# Patient Record
Sex: FEMALE | Race: WHITE
[De-identification: names, ages, dates, MRNs, and addresses within clinical notes are randomized per-mention and may not be internally consistent; named-entity substitution may affect disease eponyms.]

---
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
## 2020-05-22 DIAGNOSIS — J30.2 OTHER SEASONAL ALLERGIC RHINITIS: ICD-10-CM

## 2020-05-22 RX ORDER — BUDESONIDE AND FORMOTEROL FUMARATE DIHYDRATE 80; 4.5 UG/1; UG/1
80-4.5 AEROSOL RESPIRATORY (INHALATION)
Refills: 0 | Status: ACTIVE | COMMUNITY

## 2020-05-27 ENCOUNTER — APPOINTMENT (OUTPATIENT)
Dept: CARDIOLOGY | Facility: CLINIC | Age: 20
End: 2020-05-27
Payer: COMMERCIAL

## 2020-05-27 DIAGNOSIS — J45.20 MILD INTERMITTENT ASTHMA, UNCOMPLICATED: ICD-10-CM

## 2020-05-27 DIAGNOSIS — I10 ESSENTIAL (PRIMARY) HYPERTENSION: ICD-10-CM

## 2020-05-27 PROCEDURE — 99203 OFFICE O/P NEW LOW 30 MIN: CPT | Mod: 95

## 2020-05-27 NOTE — DISCUSSION/SUMMARY
[Hypertension] : hypertension [FreeTextEntry1] : \par Currently stable from a cardiovascular standpoint. Borderline hypertensive lately. Appears asymptomatic. Advised patient to continue monitoring BP at this time while off of oral contraceptive. Should BP (DBP has been >= 90 mmHg lately) remain elevated, will consider anti-hypertensive medication. Low salt diet advised. Patient to call our office with BP readings this upcoming week. May consider echo to assess cardiac structures and function. In addition, may consider labs to look for secondary causes of HTN.

## 2020-05-27 NOTE — HISTORY OF PRESENT ILLNESS
[FreeTextEntry1] : Patient with obesity, asthma, and possible insulin resistance. Conditions have been stable. Patient was noted to have elevated BP back in April (160/84 mmHg) while in doctor's office. She feels well and has not had BP issues in the past. Of note, she started oral contraceptive in 2018 and discontinued on 5/7/20 as it was thought to possibly contribute to the elevated BP. Since stopping, BP has been about 130/90's mmHg. She has asthma but it seems to be controlled. She has been under a lot of stress during this pandemic with school (2nd year of undergraduate studies) and just in general. Denies chest pain, shortness of breath, palpitations, headaches, or dizziness. Walks frequently without issues.

## 2022-07-29 ENCOUNTER — NON-APPOINTMENT (OUTPATIENT)
Age: 22
End: 2022-07-29

## 2022-07-30 ENCOUNTER — NON-APPOINTMENT (OUTPATIENT)
Age: 22
End: 2022-07-30

## 2022-11-29 ENCOUNTER — INPATIENT (INPATIENT)
Facility: HOSPITAL | Age: 22
LOS: 1 days | Discharge: HOME | DRG: 552 | End: 2022-12-01
Attending: NEUROLOGICAL SURGERY | Admitting: NEUROLOGICAL SURGERY
Payer: COMMERCIAL

## 2022-11-29 VITALS
DIASTOLIC BLOOD PRESSURE: 98 MMHG | HEART RATE: 101 BPM | RESPIRATION RATE: 19 BRPM | OXYGEN SATURATION: 97 % | SYSTOLIC BLOOD PRESSURE: 133 MMHG | TEMPERATURE: 98 F

## 2022-11-29 DIAGNOSIS — M54.30 SCIATICA, UNSPECIFIED SIDE: ICD-10-CM

## 2022-11-29 LAB
ALBUMIN SERPL ELPH-MCNC: 3.8 G/DL — SIGNIFICANT CHANGE UP (ref 3.3–5)
ALP SERPL-CCNC: 49 U/L — SIGNIFICANT CHANGE UP (ref 40–120)
ALT FLD-CCNC: 34 U/L — SIGNIFICANT CHANGE UP (ref 12–78)
ANION GAP SERPL CALC-SCNC: 6 MMOL/L — SIGNIFICANT CHANGE UP (ref 5–17)
AST SERPL-CCNC: 20 U/L — SIGNIFICANT CHANGE UP (ref 15–37)
BASOPHILS # BLD AUTO: 0.05 K/UL — SIGNIFICANT CHANGE UP (ref 0–0.2)
BASOPHILS NFR BLD AUTO: 0.5 % — SIGNIFICANT CHANGE UP (ref 0–2)
BILIRUB SERPL-MCNC: 0.7 MG/DL — SIGNIFICANT CHANGE UP (ref 0.2–1.2)
BUN SERPL-MCNC: 9 MG/DL — SIGNIFICANT CHANGE UP (ref 7–23)
CALCIUM SERPL-MCNC: 9.7 MG/DL — SIGNIFICANT CHANGE UP (ref 8.5–10.1)
CHLORIDE SERPL-SCNC: 108 MMOL/L — SIGNIFICANT CHANGE UP (ref 96–108)
CO2 SERPL-SCNC: 22 MMOL/L — SIGNIFICANT CHANGE UP (ref 22–31)
CREAT SERPL-MCNC: 0.52 MG/DL — SIGNIFICANT CHANGE UP (ref 0.5–1.3)
EGFR: 135 ML/MIN/1.73M2 — SIGNIFICANT CHANGE UP
EOSINOPHIL # BLD AUTO: 0.09 K/UL — SIGNIFICANT CHANGE UP (ref 0–0.5)
EOSINOPHIL NFR BLD AUTO: 0.8 % — SIGNIFICANT CHANGE UP (ref 0–6)
FLUAV AG NPH QL: SIGNIFICANT CHANGE UP
FLUBV AG NPH QL: SIGNIFICANT CHANGE UP
GLUCOSE SERPL-MCNC: 99 MG/DL — SIGNIFICANT CHANGE UP (ref 70–99)
HCG SERPL-ACNC: <1 MIU/ML — SIGNIFICANT CHANGE UP
HCT VFR BLD CALC: 41.4 % — SIGNIFICANT CHANGE UP (ref 34.5–45)
HGB BLD-MCNC: 13.5 G/DL — SIGNIFICANT CHANGE UP (ref 11.5–15.5)
IMM GRANULOCYTES NFR BLD AUTO: 0.4 % — SIGNIFICANT CHANGE UP (ref 0–0.9)
LYMPHOCYTES # BLD AUTO: 19.5 % — SIGNIFICANT CHANGE UP (ref 13–44)
LYMPHOCYTES # BLD AUTO: 2.1 K/UL — SIGNIFICANT CHANGE UP (ref 1–3.3)
MCHC RBC-ENTMCNC: 27.6 PG — SIGNIFICANT CHANGE UP (ref 27–34)
MCHC RBC-ENTMCNC: 32.6 GM/DL — SIGNIFICANT CHANGE UP (ref 32–36)
MCV RBC AUTO: 84.7 FL — SIGNIFICANT CHANGE UP (ref 80–100)
MONOCYTES # BLD AUTO: 0.55 K/UL — SIGNIFICANT CHANGE UP (ref 0–0.9)
MONOCYTES NFR BLD AUTO: 5.1 % — SIGNIFICANT CHANGE UP (ref 2–14)
NEUTROPHILS # BLD AUTO: 7.96 K/UL — HIGH (ref 1.8–7.4)
NEUTROPHILS NFR BLD AUTO: 73.7 % — SIGNIFICANT CHANGE UP (ref 43–77)
PLATELET # BLD AUTO: 266 K/UL — SIGNIFICANT CHANGE UP (ref 150–400)
POTASSIUM SERPL-MCNC: 3.9 MMOL/L — SIGNIFICANT CHANGE UP (ref 3.5–5.3)
POTASSIUM SERPL-SCNC: 3.9 MMOL/L — SIGNIFICANT CHANGE UP (ref 3.5–5.3)
PROT SERPL-MCNC: 7.6 GM/DL — SIGNIFICANT CHANGE UP (ref 6–8.3)
RBC # BLD: 4.89 M/UL — SIGNIFICANT CHANGE UP (ref 3.8–5.2)
RBC # FLD: 12.1 % — SIGNIFICANT CHANGE UP (ref 10.3–14.5)
RSV RNA NPH QL NAA+NON-PROBE: SIGNIFICANT CHANGE UP
SARS-COV-2 RNA SPEC QL NAA+PROBE: SIGNIFICANT CHANGE UP
SODIUM SERPL-SCNC: 136 MMOL/L — SIGNIFICANT CHANGE UP (ref 135–145)
WBC # BLD: 10.79 K/UL — HIGH (ref 3.8–10.5)
WBC # FLD AUTO: 10.79 K/UL — HIGH (ref 3.8–10.5)

## 2022-11-29 PROCEDURE — 0241U: CPT

## 2022-11-29 PROCEDURE — 97530 THERAPEUTIC ACTIVITIES: CPT | Mod: GP

## 2022-11-29 PROCEDURE — 97116 GAIT TRAINING THERAPY: CPT | Mod: GP

## 2022-11-29 PROCEDURE — 90686 IIV4 VACC NO PRSV 0.5 ML IM: CPT

## 2022-11-29 PROCEDURE — 99221 1ST HOSP IP/OBS SF/LOW 40: CPT

## 2022-11-29 PROCEDURE — 97162 PT EVAL MOD COMPLEX 30 MIN: CPT | Mod: GP

## 2022-11-29 PROCEDURE — 90471 IMMUNIZATION ADMIN: CPT

## 2022-11-29 PROCEDURE — 99285 EMERGENCY DEPT VISIT HI MDM: CPT

## 2022-11-29 PROCEDURE — 72148 MRI LUMBAR SPINE W/O DYE: CPT | Mod: 26,MC

## 2022-11-29 RX ORDER — HYDROMORPHONE HYDROCHLORIDE 2 MG/ML
0.5 INJECTION INTRAMUSCULAR; INTRAVENOUS; SUBCUTANEOUS
Refills: 0 | Status: DISCONTINUED | OUTPATIENT
Start: 2022-11-29 | End: 2022-12-01

## 2022-11-29 RX ORDER — MORPHINE SULFATE 50 MG/1
4 CAPSULE, EXTENDED RELEASE ORAL ONCE
Refills: 0 | Status: DISCONTINUED | OUTPATIENT
Start: 2022-11-29 | End: 2022-11-29

## 2022-11-29 RX ORDER — SENNA PLUS 8.6 MG/1
2 TABLET ORAL AT BEDTIME
Refills: 0 | Status: DISCONTINUED | OUTPATIENT
Start: 2022-11-29 | End: 2022-12-01

## 2022-11-29 RX ORDER — ACETAMINOPHEN 500 MG
650 TABLET ORAL EVERY 6 HOURS
Refills: 0 | Status: DISCONTINUED | OUTPATIENT
Start: 2022-11-29 | End: 2022-12-01

## 2022-11-29 RX ORDER — OXYCODONE HYDROCHLORIDE 5 MG/1
5 TABLET ORAL EVERY 4 HOURS
Refills: 0 | Status: DISCONTINUED | OUTPATIENT
Start: 2022-11-29 | End: 2022-12-01

## 2022-11-29 RX ORDER — CYCLOBENZAPRINE HYDROCHLORIDE 10 MG/1
10 TABLET, FILM COATED ORAL THREE TIMES A DAY
Refills: 0 | Status: DISCONTINUED | OUTPATIENT
Start: 2022-11-29 | End: 2022-12-01

## 2022-11-29 RX ORDER — DEXAMETHASONE 0.5 MG/5ML
4 ELIXIR ORAL EVERY 6 HOURS
Refills: 0 | Status: DISCONTINUED | OUTPATIENT
Start: 2022-11-29 | End: 2022-12-01

## 2022-11-29 RX ORDER — SPIRONOLACTONE 25 MG/1
3 TABLET, FILM COATED ORAL
Qty: 0 | Refills: 0 | DISCHARGE

## 2022-11-29 RX ORDER — SPIRONOLACTONE 25 MG/1
75 TABLET, FILM COATED ORAL AT BEDTIME
Refills: 0 | Status: DISCONTINUED | OUTPATIENT
Start: 2022-11-29 | End: 2022-12-01

## 2022-11-29 RX ORDER — FAMOTIDINE 10 MG/ML
20 INJECTION INTRAVENOUS EVERY 12 HOURS
Refills: 0 | Status: DISCONTINUED | OUTPATIENT
Start: 2022-11-29 | End: 2022-12-01

## 2022-11-29 RX ORDER — ONDANSETRON 8 MG/1
4 TABLET, FILM COATED ORAL EVERY 6 HOURS
Refills: 0 | Status: DISCONTINUED | OUTPATIENT
Start: 2022-11-29 | End: 2022-12-01

## 2022-11-29 RX ORDER — ACETAMINOPHEN 500 MG
1000 TABLET ORAL EVERY 6 HOURS
Refills: 0 | Status: DISCONTINUED | OUTPATIENT
Start: 2022-11-29 | End: 2022-12-01

## 2022-11-29 RX ORDER — INFLUENZA VIRUS VACCINE 15; 15; 15; 15 UG/.5ML; UG/.5ML; UG/.5ML; UG/.5ML
0.5 SUSPENSION INTRAMUSCULAR ONCE
Refills: 0 | Status: COMPLETED | OUTPATIENT
Start: 2022-11-29 | End: 2022-12-01

## 2022-11-29 RX ORDER — ACETAMINOPHEN 500 MG
2 TABLET ORAL
Qty: 0 | Refills: 0 | DISCHARGE

## 2022-11-29 RX ORDER — NORGESTIMATE AND ETHINYL ESTRADIOL 7DAYSX3 LO
1 KIT ORAL
Qty: 0 | Refills: 0 | DISCHARGE

## 2022-11-29 RX ORDER — OXYCODONE HYDROCHLORIDE 5 MG/1
10 TABLET ORAL EVERY 4 HOURS
Refills: 0 | Status: DISCONTINUED | OUTPATIENT
Start: 2022-11-29 | End: 2022-12-01

## 2022-11-29 RX ORDER — KETOCONAZOLE 20 MG/G
1 AEROSOL, FOAM TOPICAL
Qty: 0 | Refills: 0 | DISCHARGE

## 2022-11-29 RX ORDER — ALBUTEROL 90 UG/1
2 AEROSOL, METERED ORAL
Qty: 0 | Refills: 0 | DISCHARGE

## 2022-11-29 RX ORDER — BUDESONIDE AND FORMOTEROL FUMARATE DIHYDRATE 160; 4.5 UG/1; UG/1
2 AEROSOL RESPIRATORY (INHALATION)
Qty: 0 | Refills: 0 | DISCHARGE

## 2022-11-29 RX ORDER — DEXAMETHASONE 0.5 MG/5ML
6 ELIXIR ORAL ONCE
Refills: 0 | Status: COMPLETED | OUTPATIENT
Start: 2022-11-29 | End: 2022-11-29

## 2022-11-29 RX ORDER — BUDESONIDE AND FORMOTEROL FUMARATE DIHYDRATE 160; 4.5 UG/1; UG/1
2 AEROSOL RESPIRATORY (INHALATION)
Refills: 0 | Status: DISCONTINUED | OUTPATIENT
Start: 2022-11-29 | End: 2022-12-01

## 2022-11-29 RX ORDER — ALBUTEROL 90 UG/1
2 AEROSOL, METERED ORAL EVERY 6 HOURS
Refills: 0 | Status: DISCONTINUED | OUTPATIENT
Start: 2022-11-29 | End: 2022-12-01

## 2022-11-29 RX ORDER — CYCLOBENZAPRINE HYDROCHLORIDE 10 MG/1
10 TABLET, FILM COATED ORAL ONCE
Refills: 0 | Status: COMPLETED | OUTPATIENT
Start: 2022-11-29 | End: 2022-11-29

## 2022-11-29 RX ADMIN — CYCLOBENZAPRINE HYDROCHLORIDE 10 MILLIGRAM(S): 10 TABLET, FILM COATED ORAL at 15:47

## 2022-11-29 RX ADMIN — MORPHINE SULFATE 4 MILLIGRAM(S): 50 CAPSULE, EXTENDED RELEASE ORAL at 16:50

## 2022-11-29 RX ADMIN — OXYCODONE HYDROCHLORIDE 10 MILLIGRAM(S): 5 TABLET ORAL at 21:44

## 2022-11-29 RX ADMIN — Medication 6 MILLIGRAM(S): at 15:47

## 2022-11-29 NOTE — ED STATDOCS - CLINICAL SUMMARY MEDICAL DECISION MAKING FREE TEXT BOX
pt presents to the ED w/ lower back pain, sent in by Dr. Mendoza's team. Plan: Contact Dr. Mendoza's team. pt presents to the ED w/ lower back pain, sent in by Dr. Mendoza's team. Plan: Contact Dr. Mendoza's team.    signed DANIEL Caballero Dr in ED to speak to pt and family. Plan admission due to large lumbar disc herniation on MRI and pt still in pain with walking or movement. High likelihood of bounceback to ER. Admit to Dr Liang's service for pain treatment. Pt agrees with admission and plan of care.

## 2022-11-29 NOTE — ED STATDOCS - PROGRESS NOTE DETAILS
signed Tori Purcell PA-C Pt seen initially in intake by Dr. Martin   22F with low back pain radiating down bilat LEs R>L. Pt sent for eval and MRI by neurosx Dr Hannon. No significant findings on labwork. MRI with right sided disc herniation L4 L5. I spoke to neurosx PA who will speak to Dr Hannon. Pt feeling better after meds but only if she is lying flat on her pain.

## 2022-11-29 NOTE — ED STATDOCS - MUSCULOSKELETAL, MLM
TTP to lower lumbar spine. Pt able to ambulate. TTP to lower lumbar spine. Pt able to ambulate, but uncomfortable.  Distal n/v/m intact

## 2022-11-29 NOTE — ED STATDOCS - NS ED ATTENDING STATEMENT MOD
This was a shared visit with the JUDIT. I reviewed and verified the documentation and independently performed the documented:

## 2022-11-29 NOTE — ED ADULT TRIAGE NOTE - CHIEF COMPLAINT QUOTE
Pt arrives to ED sent in by MD Mendoza for lower back pain not relieved by flexeril. denies other medical history.

## 2022-11-29 NOTE — H&P ADULT - ASSESSMENT
Patient is a 22 year old LH female with significant PMH that p/w LBP radiating down bilateral LEs R >L since 11/26, she reports she also has associated numbness in bilateral thighs & R foot.  MRI L spine significant for a large, right sided L4-5 disc herniation with impingement on exiting nerve root.    Plan:  - Admit to [facility]  - Pain control  - Given 6mg Decadron in ER continue 4mg every 6 hours  - Dr. Arriola discussed with patient at bedside she would like to see how she does with medication before pursuing surgery  - PT/OOB  - GI ppx with steroid  - SCDs DVT ppx    Discussed with Dr. Arriola

## 2022-11-29 NOTE — H&P ADULT - HISTORY OF PRESENT ILLNESS
Patient is a 22 year old LH female with no significant PMH, sent from Dr. Arriola office p/w LBP radiating down bilateral LEs R >L since 11/26, she reports she also has associated numbness in bilateral thighs & R foot. She denies any inciting event however is a . She denies any recent trauma, bowel/bladder dysfunction or saddle anesthesia.

## 2022-11-29 NOTE — PHARMACOTHERAPY INTERVENTION NOTE - COMMENTS
Medication reconciliation completed.  Reviewed Medication list and confirmed med allergies with patient; confirmed with Dr. Cao [facility].  Pt confirms that the Flexeril & Meloxicam were recently prescribed from a Doctor who had misdiagnosed her current pain, and stopped taking as of today.

## 2022-11-29 NOTE — CONSULT NOTE ADULT - ASSESSMENT
22 year old female with LBP radiating to bilateral LEs  MRI L spine ordered  pain meds/muscle relaxants  d/w Dr Kumar

## 2022-11-29 NOTE — ED ADULT NURSE NOTE - OBJECTIVE STATEMENT
Pt C/O LOWER BACK PAIN STARTED month ago increase in pain this Saturday. Pt was send by Dr Mendoza for MRI of her back . pt is able to walk pain radiating to left leg denies numbness.

## 2022-11-29 NOTE — CONSULT NOTE ADULT - SUBJECTIVE AND OBJECTIVE BOX
22 year old LH female with significant PMH that p/w LBP rdaiating down bilateral LEs R >L since 11/26, she reports she also has associated numbness in bilateral thighs & R foot. She denies any recent trauma, bowel/bladder dysfunction or saddle anesthesia.      PAST MEDICAL & SURGICAL HISTORY:   denies      Allergies    No Known Allergies    SOCIAL HISTORY: nonsmoker occasional etoh, no drug use    FAMILY HISTORY: non contributory    Vital Signs Last 24 Hrs  T(C): 36.7 (29 Nov 2022 14:12), Max: 36.7 (29 Nov 2022 14:12)  T(F): 98.1 (29 Nov 2022 14:12), Max: 98.1 (29 Nov 2022 14:12)  HR: 101 (29 Nov 2022 14:12) (101 - 101)  BP: 133/98 (29 Nov 2022 14:12) (133/98 - 133/98)  BP(mean): 109 (29 Nov 2022 14:12) (109 - 109)  RR: 19 (29 Nov 2022 14:12) (19 - 19)  SpO2: 97% (29 Nov 2022 14:12) (97% - 97%)    Parameters below as of 29 Nov 2022 14:12  Patient On (Oxygen Delivery Method): room air        LABS:                        13.5   10.79 )-----------( 266      ( 29 Nov 2022 15:34 )             41.4     11-29    136  |  108  |  9   ----------------------------<  99  3.9   |  22  |  0.52    Ca    9.7      29 Nov 2022 15:34    TPro  7.6  /  Alb  3.8  /  TBili  0.7  /  DBili  x   /  AST  20  /  ALT  34  /  AlkPhos  49  11-29          REVIEW OF SYSTEMS: denies any recent illness, trauma, chest pain, palpitations, SOB or abdominal pain    Constitutional: awake and alert.  HEENT: PERRL, EOMI  Neck: Supple.  Respiratory: Breath sounds are clear bilaterally  Cardiovascular: S1 and S2, regular rhythm  Gastrointestinal: soft, nontender  Extremities:  no edema  Musculoskeletal: no joint swelling/tenderness, no abnormal movements  Skin: No rashes    Neurological exam:   Awake alert, AOx3, follows commands  Face symmetrical, speech clear & fluent  pupils reactive bilat, EOMI, no nystagmus  Motor: No pronator drift, Strength 5/5 in all 4 ext, normal bulk and tone, no tremor, rigidity or bradykinesia  no Hoffmans, no clonus  Sens: Intact to light touch  Gait/Balance: Not tested

## 2022-11-29 NOTE — H&P ADULT - NSHPPHYSICALEXAM_GEN_ALL_CORE
Constitutional: awake and alert.  HEENT: PERRL, EOMI  Neck: Supple.  Respiratory: Breath sounds are clear bilaterally  Cardiovascular: S1 and S2, regular rhythm  Gastrointestinal: soft, nontender  Extremities:  no edema  Musculoskeletal: no joint swelling/tenderness, no abnormal movements  Skin: No rashes    Neurological exam:   Awake alert, AOx3, follows commands  Face symmetrical, speech clear & fluent  pupils reactive bilat, EOMI, no nystagmus  Motor: No pronator drift, Strength 5/5 in all 4 ext, normal bulk and tone, no tremor, rigidity or bradykinesia  no Hoffmans, no clonus  Sens: Intact to light touch  Gait/Balance: Not tested

## 2022-11-29 NOTE — H&P ADULT - NSHPLABSRESULTS_GEN_ALL_CORE
MR Lumbar Spine No Cont (11.29.22 @ 18:38)  IMPRESSION: Large right-sided lateral recess disc herniation at the L4-L5   level with impingement upon the descending right-sided L5 nerve roots.

## 2022-11-29 NOTE — ED STATDOCS - ATTENDING APP SHARED VISIT CONTRIBUTION OF CARE
I, Hermilo Martin MD,  performed the initial face to face bedside interview with this patient regarding history of present illness, review of symptoms and relevant past medical, social and family history.  I completed an independent physical examination.  I was the initial provider who evaluated this patient.   I personally saw the patient and performed a substantive portion of the visit including all aspects of the medical decision making.  I have signed out the follow up of any pending tests (i.e. labs, radiological studies) to the JUDIT.  I have communicated the patient’s plan of care and disposition with the JUDIT.  The history, relevant review of systems, past medical and surgical history, medical decision making, and physical examination was documented by the scribe in my presence and I attest to the accuracy of the documentation.

## 2022-11-30 PROCEDURE — 99221 1ST HOSP IP/OBS SF/LOW 40: CPT

## 2022-11-30 PROCEDURE — 99231 SBSQ HOSP IP/OBS SF/LOW 25: CPT

## 2022-11-30 RX ORDER — ENOXAPARIN SODIUM 100 MG/ML
40 INJECTION SUBCUTANEOUS EVERY 24 HOURS
Refills: 0 | Status: DISCONTINUED | OUTPATIENT
Start: 2022-11-30 | End: 2022-12-01

## 2022-11-30 RX ADMIN — CYCLOBENZAPRINE HYDROCHLORIDE 10 MILLIGRAM(S): 10 TABLET, FILM COATED ORAL at 10:14

## 2022-11-30 RX ADMIN — SENNA PLUS 2 TABLET(S): 8.6 TABLET ORAL at 21:26

## 2022-11-30 RX ADMIN — Medication 4 MILLIGRAM(S): at 23:10

## 2022-11-30 RX ADMIN — OXYCODONE HYDROCHLORIDE 10 MILLIGRAM(S): 5 TABLET ORAL at 08:38

## 2022-11-30 RX ADMIN — OXYCODONE HYDROCHLORIDE 10 MILLIGRAM(S): 5 TABLET ORAL at 08:08

## 2022-11-30 RX ADMIN — SPIRONOLACTONE 75 MILLIGRAM(S): 25 TABLET, FILM COATED ORAL at 21:26

## 2022-11-30 RX ADMIN — Medication 4 MILLIGRAM(S): at 05:16

## 2022-11-30 RX ADMIN — FAMOTIDINE 20 MILLIGRAM(S): 10 INJECTION INTRAVENOUS at 21:27

## 2022-11-30 RX ADMIN — ENOXAPARIN SODIUM 40 MILLIGRAM(S): 100 INJECTION SUBCUTANEOUS at 17:11

## 2022-11-30 RX ADMIN — Medication 4 MILLIGRAM(S): at 17:11

## 2022-11-30 RX ADMIN — Medication 4 MILLIGRAM(S): at 00:02

## 2022-11-30 RX ADMIN — Medication 4 MILLIGRAM(S): at 12:19

## 2022-11-30 RX ADMIN — FAMOTIDINE 20 MILLIGRAM(S): 10 INJECTION INTRAVENOUS at 10:13

## 2022-11-30 NOTE — PHYSICAL THERAPY INITIAL EVALUATION ADULT - PATIENT/FAMILY/SIGNIFICANT OTHER GOALS STATEMENT, PT EVAL
The "pt was offered surgery" as per the neurosurgery note, but the patient "would first like to try conservative management"

## 2022-11-30 NOTE — PHYSICAL THERAPY INITIAL EVALUATION ADULT - DIAGNOSIS, PT EVAL
22 y.o. female with Low Back Pain radiating to bilateral LEs - MRI ordered, pt wants to attempt conservative management prior to considering sx 22 y.o. female with Low Back Pain radiating to bilateral LEs - MRI shows large L4-L5 disc herniation on the right side. Pt with c/o pain radiating down right leg.

## 2022-11-30 NOTE — PHYSICAL THERAPY INITIAL EVALUATION ADULT - IMPAIRMENTS CONTRIBUTING TO GAIT DEVIATIONS, PT EVAL
The pt reports that she was walking much better today compared to recent attempts to ambulate yesterday. The pt c/o persistent pain but improvement was noted.

## 2022-11-30 NOTE — CONSULT NOTE ADULT - SUBJECTIVE AND OBJECTIVE BOX
PCP:  Dr logan perkins    CHIEF COMPLAINT: back pain    HISTORY OF THE PRESENT ILLNESS: this is a 23 yo female with PMH of asthma, uses inhalers, never hospitalized for, who has had a 1 week history of lower back pain, denies injury or trauma and feels that it may have been from lifting weights at the gym. over th past week  she has tried the basic modalities but on the day of admission the pain was severe  with radiation to BLE's R>L, with associated numbness to B/L thighs and right foot.  She went to See Dr Mendoza who sent her to the ER.  MRI + Large right-sided lateral recess disc herniation at the L4-L5   level with impingement upon the descending right-sided L5 nerve roots.  On f/u different options were presented to the pt and currently she is trying tapering steroids before agreeing to surgery.  Seen at bedside, awake, alert, presently comfortable, pain increases with any movement  she denies any recent f/c/r  no burning on urinations  denies any issues with anesthesia but has only had it x 1 with wisdom teeth removed  works out at gym regularly and plays softball.  denies any cardiac history          PAST MEDICAL HISTORY: as above    PAST SURGICAL HISTORY: wisdom teeth removal    FAMILY HISTORY:   father: prostate Ca    SOCIAL HISTORY:  no smoking, rare alcohol, no drugs    ALLERGIES: NKDA    HOME MEDS: BCP    REVIEW OF SYSTEMS:   All 10 systems reviewed in detailed and found to be negative with the exception of what has already been described above    MEDICATIONS  (STANDING):  dexAMETHasone  Injectable 4 milliGRAM(s) IV Push every 6 hours  famotidine    Tablet 20 milliGRAM(s) Oral every 12 hours  influenza   Vaccine 0.5 milliLiter(s) IntraMuscular once  senna 2 Tablet(s) Oral at bedtime  spironolactone 75 milliGRAM(s) Oral at bedtime    MEDICATIONS  (PRN):  acetaminophen     Tablet .. 650 milliGRAM(s) Oral every 6 hours PRN Temp greater or equal to 38C (100.4F), Mild Pain (1 - 3)  acetaminophen   IVPB .. 1000 milliGRAM(s) IV Intermittent every 6 hours PRN Severe Pain (7 - 10)  albuterol    90 MICROgram(s) HFA Inhaler 2 Puff(s) Inhalation every 6 hours PRN Shortness of Breath and/or Wheezing  budesonide  80 MICROgram(s)/formoterol 4.5 MICROgram(s) Inhaler 2 Puff(s) Inhalation two times a day PRN sob/wheezing  cyclobenzaprine 10 milliGRAM(s) Oral three times a day PRN Muscle Spasm  HYDROmorphone  Injectable 0.5 milliGRAM(s) IV Push every 3 hours PRN severe breakthrough  ondansetron Injectable 4 milliGRAM(s) IV Push every 6 hours PRN Nausea and/or Vomiting  oxyCODONE    IR 5 milliGRAM(s) Oral every 4 hours PRN Moderate Pain (4 - 6)  oxyCODONE    IR 10 milliGRAM(s) Oral every 4 hours PRN Severe Pain (7 - 10)      VITALS:  T(F): 97.3 (11-30-22 @ 09:04), Max: 98.1 (11-29-22 @ 14:12)  HR: 69 (11-30-22 @ 09:04) (60 - 101)  BP: 122/67 (11-30-22 @ 09:04) (121/65 - 146/79)  RR: 18 (11-30-22 @ 09:04) (18 - 19)  SpO2: 100% (11-30-22 @ 09:04) (97% - 100%)  Wt(kg): --    I&O's Summary    PHYSICAL EXAM:    GENERAL: Comfortable, no acute distress   HEAD:  Normocephalic, atraumatic  EYES: EOMI, PERRLA  HEENT: Moist mucous membranes  NECK: Supple, No JVD  NERVOUS SYSTEM:  Alert & Oriented X3, Motor Strength 5/5 B/L upper and lower extremities  CHEST/LUNG: Clear to auscultation bilaterally  HEART: Regular rate and rhythm  ABDOMEN: Soft, non tender, Nondistended, Bowel sounds present  GENITOURINARY: Voiding, no palpable bladder  EXTREMITIES:   No clubbing, cyanosis, or edema  MUSCULOSKELETAL- + low back pain  SKIN-no rash    LABS:                        13.5   10.79 )-----------( 266      ( 29 Nov 2022 15:34 )             41.4     11-29    136  |  108  |  9   ----------------------------<  99  3.9   |  22  |  0.52    Ca    9.7      29 Nov 2022 15:34    TPro  7.6  /  Alb  3.8  /  TBili  0.7  /  DBili  x   /  AST  20  /  ALT  34  /  AlkPhos  49  11-29        LIVER FUNCTIONS - ( 29 Nov 2022 15:34 )  Alb: 3.8 g/dL / Pro: 7.6 gm/dL / ALK PHOS: 49 U/L / ALT: 34 U/L / AST: 20 U/L / GGT: x                 RADIOLOGY STUDIES:    < from: MR Lumbar Spine No Cont (11.29.22 @ 18:38) >    ACC: [id number] EXAM:  MR SPINE LUMBAR                          PROCEDURE DATE:  11/29/2022          INTERPRETATION:  .    CLINICAL INFORMATION: Severe lumbar radiculopathy. Pain. Lumbar stenosis.    TECHNIQUE: Multiplanar multi sequential MRI examination of the   lumbosacral spine was performed without the administration of IV   gadolinium.    COMPARISON: None available at our institution.    FINDINGS: No acute fractures or dislocations are seen. The lumbar   lordotic curvature is preserved. There is no loss of vertebral body   height or aggressive marrow signal change. The conus medullaris appears   normal in signal and morphology and terminates appropriately.    The findings at the individual disc space levels are as follows:    T12-L1: Normal.    L1-L2: Normal.    L2-L3: Normal.    L3-L4: Disc space desiccation with relative preservation of height is   seen. An annular fissure is seen in the central zone canal. A shallow   bulging disc is seen which minimally deforms the ventral thecal sac.   There is no spinal canal or foraminal stenosis.    L4-L5: Disc space desiccation with very minor loss of height is seen. A   bulging disc is seen with a superimposed broad-based disc protrusion   which measures 1.0 x 2.2 cm in the axial plane(AP by transverse) with   the apex in the right lateral recess. The disc herniation displaces and   compresses the descending right-sided L5 nerve roots. There is overall   moderate canal stenosis. No foraminal narrowing is appreciated.    L5-S1: Normal.    IMPRESSION: Large right-sided lateral recess disc herniation at the L4-L5   level with impingement upon the descending right-sided L5 nerve roots.              IMPRESSION:  23 yo female with above pmh a/w:    # acute low back pain with radiculopathy  #Large right sided disc herniations at the L4-5 level with impingement on L5 nerve roots  # admitted to spine surgery  PT eval  cont steroids  pain control with hydromorph Iv or Oxycodone po  muscle relaxers  If surgery planned pt is medically optimized for the planned procedure with a RCI Class 1 point with a class II risk and 6.0 % 30- day risk of death, MI or cardiac arrest    #asthma  cont inhalers    # VTE prophylaxis  heparin or lovenox sq if ok with spine surgery for now  yadi      Thank you for the consult, will follow   PCP:  Dr logan perkins    CHIEF COMPLAINT: back pain    HISTORY OF THE PRESENT ILLNESS: this is a 23 yo female with PMH of asthma, uses inhalers, never hospitalized for, who has had a 1 week history of lower back pain, denies injury or trauma and feels that it may have been from lifting weights at the gym approx 1 week ago.  Over the past week she has tried the basic modalitiesnfor low back pain, heat, ice etc, but on the day of admission the pain was severe with radiation to BLE's R>L, with associated numbness to B/L thighs and right foot.  She went to See Dr Mendoza who sent her to the ER.  MRI + Large right-sided lateral recess disc herniation at the L4-L5 level with impingement upon the descending right-sided L5 nerve roots.  On neurosurgery f/u with the pt different options were presented  and currently the pt prefers to try tapering steroids before agreeing to surgery.  Seen at bedside, awake, alert, presently comfortable, pain increases with any movement  she denies any recent f/c/r  no burning on urinations  denies any issues with anesthesia but has only one surgery  with wisdom teeth removed  works out at gym regularly and plays softball.  denies any cardiac history          PAST MEDICAL HISTORY: as above    PAST SURGICAL HISTORY: wisdom teeth removal    FAMILY HISTORY:   father: prostate Ca    SOCIAL HISTORY:  no smoking, rare alcohol, no drugs    ALLERGIES: NKDA    HOME MEDS: BCP    REVIEW OF SYSTEMS:   All 10 systems reviewed in detailed and found to be negative with the exception of what has already been described above    MEDICATIONS  (STANDING):  dexAMETHasone  Injectable 4 milliGRAM(s) IV Push every 6 hours  famotidine    Tablet 20 milliGRAM(s) Oral every 12 hours  influenza   Vaccine 0.5 milliLiter(s) IntraMuscular once  senna 2 Tablet(s) Oral at bedtime  spironolactone 75 milliGRAM(s) Oral at bedtime    MEDICATIONS  (PRN):  acetaminophen     Tablet .. 650 milliGRAM(s) Oral every 6 hours PRN Temp greater or equal to 38C (100.4F), Mild Pain (1 - 3)  acetaminophen   IVPB .. 1000 milliGRAM(s) IV Intermittent every 6 hours PRN Severe Pain (7 - 10)  albuterol    90 MICROgram(s) HFA Inhaler 2 Puff(s) Inhalation every 6 hours PRN Shortness of Breath and/or Wheezing  budesonide  80 MICROgram(s)/formoterol 4.5 MICROgram(s) Inhaler 2 Puff(s) Inhalation two times a day PRN sob/wheezing  cyclobenzaprine 10 milliGRAM(s) Oral three times a day PRN Muscle Spasm  HYDROmorphone  Injectable 0.5 milliGRAM(s) IV Push every 3 hours PRN severe breakthrough  ondansetron Injectable 4 milliGRAM(s) IV Push every 6 hours PRN Nausea and/or Vomiting  oxyCODONE    IR 5 milliGRAM(s) Oral every 4 hours PRN Moderate Pain (4 - 6)  oxyCODONE    IR 10 milliGRAM(s) Oral every 4 hours PRN Severe Pain (7 - 10)      VITALS:  T(F): 97.3 (11-30-22 @ 09:04), Max: 98.1 (11-29-22 @ 14:12)  HR: 69 (11-30-22 @ 09:04) (60 - 101)  BP: 122/67 (11-30-22 @ 09:04) (121/65 - 146/79)  RR: 18 (11-30-22 @ 09:04) (18 - 19)  SpO2: 100% (11-30-22 @ 09:04) (97% - 100%)  Wt(kg): --    I&O's Summary    PHYSICAL EXAM:    GENERAL: Comfortable, no acute distress   HEAD:  Normocephalic, atraumatic  EYES: EOMI, PERRLA  HEENT: Moist mucous membranes  NECK: Supple, No JVD  NERVOUS SYSTEM:  Alert & Oriented X3, Motor Strength 5/5 B/L upper and lower extremities  CHEST/LUNG: Clear to auscultation bilaterally  HEART: Regular rate and rhythm  ABDOMEN: Soft, non tender, Nondistended, Bowel sounds present  GENITOURINARY: Voiding, no palpable bladder  EXTREMITIES:   No clubbing, cyanosis, or edema  MUSCULOSKELETAL- + low back pain  SKIN-no rash    LABS:                        13.5   10.79 )-----------( 266      ( 29 Nov 2022 15:34 )             41.4     11-29    136  |  108  |  9   ----------------------------<  99  3.9   |  22  |  0.52    Ca    9.7      29 Nov 2022 15:34    TPro  7.6  /  Alb  3.8  /  TBili  0.7  /  DBili  x   /  AST  20  /  ALT  34  /  AlkPhos  49  11-29        LIVER FUNCTIONS - ( 29 Nov 2022 15:34 )  Alb: 3.8 g/dL / Pro: 7.6 gm/dL / ALK PHOS: 49 U/L / ALT: 34 U/L / AST: 20 U/L / GGT: x                 RADIOLOGY STUDIES:    < from: MR Lumbar Spine No Cont (11.29.22 @ 18:38) >    ACC: [id number] EXAM:  MR SPINE LUMBAR                          PROCEDURE DATE:  11/29/2022          INTERPRETATION:  .    CLINICAL INFORMATION: Severe lumbar radiculopathy. Pain. Lumbar stenosis.    TECHNIQUE: Multiplanar multi sequential MRI examination of the   lumbosacral spine was performed without the administration of IV   gadolinium.    COMPARISON: None available at our institution.    FINDINGS: No acute fractures or dislocations are seen. The lumbar   lordotic curvature is preserved. There is no loss of vertebral body   height or aggressive marrow signal change. The conus medullaris appears   normal in signal and morphology and terminates appropriately.    The findings at the individual disc space levels are as follows:    T12-L1: Normal.    L1-L2: Normal.    L2-L3: Normal.    L3-L4: Disc space desiccation with relative preservation of height is   seen. An annular fissure is seen in the central zone canal. A shallow   bulging disc is seen which minimally deforms the ventral thecal sac.   There is no spinal canal or foraminal stenosis.    L4-L5: Disc space desiccation with very minor loss of height is seen. A   bulging disc is seen with a superimposed broad-based disc protrusion   which measures 1.0 x 2.2 cm in the axial plane(AP by transverse) with   the apex in the right lateral recess. The disc herniation displaces and   compresses the descending right-sided L5 nerve roots. There is overall   moderate canal stenosis. No foraminal narrowing is appreciated.    L5-S1: Normal.    IMPRESSION: Large right-sided lateral recess disc herniation at the L4-L5   level with impingement upon the descending right-sided L5 nerve roots.              IMPRESSION:  23 yo female with above pmh a/w:    # acute low back pain with radiculopathy  #Large right sided disc herniations at the L4-5 level with impingement on L5 nerve roots  # admitted to spine surgery  PT eval  cont steroids  pain control with hydromorph Iv or Oxycodone po  muscle relaxers  If surgery planned pt is medically optimized for the planned procedure with a RCI Class 1 point with a class II risk and 6.0 % 30- day risk of death, MI or cardiac arrest    #asthma  cont inhalers    # VTE prophylaxis  heparin or lovenox sq if ok with spine surgery for now  yadi      Thank you for the consult, will follow   PCP:  Dr logan perkins    CHIEF COMPLAINT: back pain    HISTORY OF THE PRESENT ILLNESS: this is a 23 yo female with PMH of asthma, uses inhalers, never hospitalized for, who has had a 1 week history of lower back pain, denies injury or trauma and feels that it may have been from lifting weights at the gym approx 1 week ago.  Over the past week she has tried the basic modalitiesnfor low back pain, heat, ice etc, but on the day of admission the pain was severe with radiation to BLE's R>L, with associated numbness to B/L thighs and right foot.  She went to See Dr Mendoza who sent her to the ER.  MRI + Large right-sided lateral recess disc herniation at the L4-L5 level with impingement upon the descending right-sided L5 nerve roots.  On neurosurgery f/u with the pt different options were presented  and currently the pt prefers to try tapering steroids before agreeing to surgery.  Seen at bedside, awake, alert, presently comfortable, pain increases with any movement  she denies any recent f/c/r  no burning on urinations  denies any issues with anesthesia but has only one surgery  with wisdom teeth removed  works out at gym regularly and plays softball.  denies any cardiac history          PAST MEDICAL HISTORY: as above    PAST SURGICAL HISTORY: wisdom teeth removal    FAMILY HISTORY:   father: prostate Ca    SOCIAL HISTORY:  no smoking, rare alcohol, no drugs    ALLERGIES: NKDA    HOME MEDS: BCP    REVIEW OF SYSTEMS:   All 10 systems reviewed in detailed and found to be negative with the exception of what has already been described above    MEDICATIONS  (STANDING):  dexAMETHasone  Injectable 4 milliGRAM(s) IV Push every 6 hours  famotidine    Tablet 20 milliGRAM(s) Oral every 12 hours  influenza   Vaccine 0.5 milliLiter(s) IntraMuscular once  senna 2 Tablet(s) Oral at bedtime  spironolactone 75 milliGRAM(s) Oral at bedtime    MEDICATIONS  (PRN):  acetaminophen     Tablet .. 650 milliGRAM(s) Oral every 6 hours PRN Temp greater or equal to 38C (100.4F), Mild Pain (1 - 3)  acetaminophen   IVPB .. 1000 milliGRAM(s) IV Intermittent every 6 hours PRN Severe Pain (7 - 10)  albuterol    90 MICROgram(s) HFA Inhaler 2 Puff(s) Inhalation every 6 hours PRN Shortness of Breath and/or Wheezing  budesonide  80 MICROgram(s)/formoterol 4.5 MICROgram(s) Inhaler 2 Puff(s) Inhalation two times a day PRN sob/wheezing  cyclobenzaprine 10 milliGRAM(s) Oral three times a day PRN Muscle Spasm  HYDROmorphone  Injectable 0.5 milliGRAM(s) IV Push every 3 hours PRN severe breakthrough  ondansetron Injectable 4 milliGRAM(s) IV Push every 6 hours PRN Nausea and/or Vomiting  oxyCODONE    IR 5 milliGRAM(s) Oral every 4 hours PRN Moderate Pain (4 - 6)  oxyCODONE    IR 10 milliGRAM(s) Oral every 4 hours PRN Severe Pain (7 - 10)      VITALS:  T(F): 97.3 (11-30-22 @ 09:04), Max: 98.1 (11-29-22 @ 14:12)  HR: 69 (11-30-22 @ 09:04) (60 - 101)  BP: 122/67 (11-30-22 @ 09:04) (121/65 - 146/79)  RR: 18 (11-30-22 @ 09:04) (18 - 19)  SpO2: 100% (11-30-22 @ 09:04) (97% - 100%)  Wt(kg): --    I&O's Summary    PHYSICAL EXAM:    GENERAL: Comfortable, no acute distress   HEAD:  Normocephalic, atraumatic  EYES: EOMI, PERRLA  HEENT: Moist mucous membranes  NECK: Supple, No JVD  NERVOUS SYSTEM:  Alert & Oriented X3, Motor Strength 5/5 B/L upper and lower extremities  CHEST/LUNG: Clear to auscultation bilaterally  HEART: Regular rate and rhythm  ABDOMEN: Soft, non tender, Nondistended, Bowel sounds present  GENITOURINARY: Voiding, no palpable bladder  EXTREMITIES:   No clubbing, cyanosis, or edema  MUSCULOSKELETAL- + low back pain  SKIN-no rash    LABS:                        13.5   10.79 )-----------( 266      ( 29 Nov 2022 15:34 )             41.4     11-29    136  |  108  |  9   ----------------------------<  99  3.9   |  22  |  0.52    Ca    9.7      29 Nov 2022 15:34    TPro  7.6  /  Alb  3.8  /  TBili  0.7  /  DBili  x   /  AST  20  /  ALT  34  /  AlkPhos  49  11-29        LIVER FUNCTIONS - ( 29 Nov 2022 15:34 )  Alb: 3.8 g/dL / Pro: 7.6 gm/dL / ALK PHOS: 49 U/L / ALT: 34 U/L / AST: 20 U/L / GGT: x                 RADIOLOGY STUDIES:    < from: MR Lumbar Spine No Cont (11.29.22 @ 18:38) >    ACC: [id number] EXAM:  MR SPINE LUMBAR                          PROCEDURE DATE:  11/29/2022          INTERPRETATION:  .    CLINICAL INFORMATION: Severe lumbar radiculopathy. Pain. Lumbar stenosis.    TECHNIQUE: Multiplanar multi sequential MRI examination of the   lumbosacral spine was performed without the administration of IV   gadolinium.    COMPARISON: None available at our institution.    FINDINGS: No acute fractures or dislocations are seen. The lumbar   lordotic curvature is preserved. There is no loss of vertebral body   height or aggressive marrow signal change. The conus medullaris appears   normal in signal and morphology and terminates appropriately.    The findings at the individual disc space levels are as follows:    T12-L1: Normal.    L1-L2: Normal.    L2-L3: Normal.    L3-L4: Disc space desiccation with relative preservation of height is   seen. An annular fissure is seen in the central zone canal. A shallow   bulging disc is seen which minimally deforms the ventral thecal sac.   There is no spinal canal or foraminal stenosis.    L4-L5: Disc space desiccation with very minor loss of height is seen. A   bulging disc is seen with a superimposed broad-based disc protrusion   which measures 1.0 x 2.2 cm in the axial plane(AP by transverse) with   the apex in the right lateral recess. The disc herniation displaces and   compresses the descending right-sided L5 nerve roots. There is overall   moderate canal stenosis. No foraminal narrowing is appreciated.    L5-S1: Normal.    IMPRESSION: Large right-sided lateral recess disc herniation at the L4-L5   level with impingement upon the descending right-sided L5 nerve roots.              IMPRESSION:  23 yo female with above pmh a/w:    # acute low back pain with radiculopathy  #Large right sided disc herniations at the L4-5 level with impingement on L5 nerve roots  # admitted to spine surgery  PT eval  cont steroids  pain control with hydromorph Iv or Oxycodone po  muscle relaxers  If surgery planned pt is medically optimized for the planned procedure with a RCI Class 1 point with a class II risk and 6.0 % 30- day risk of death, MI or cardiac arrest    #asthma  cont inhalers    # VTE prophylaxis  lovenox, ok with spine surgery   yadi      Thank you for the consult, will follow

## 2022-11-30 NOTE — PROGRESS NOTE ADULT - SUBJECTIVE AND OBJECTIVE BOX
HPI: 22 year old LH female with significant PMH that p/w LBP radiating down bilateral LEs R >L since 11/26, she reports she also has associated numbness in bilateral thighs & R foot. She denies any recent trauma, bowel/bladder dysfunction or saddle anesthesia. She was admitted to the Neurosurgery service for conservative management. Started on Decadron and oxycodone.     11/30- Hospital Day #2, pt seen and examined on 2N, states pain is slightly better but has difficulty with changes in position. Tolerating decadron and states she is ambulating slighly better. Afebrile, tolerating PO, VSS.     Vital Signs Last 24 Hrs  T(C): 36.3 (30 Nov 2022 09:04), Max: 36.7 (29 Nov 2022 14:12)  T(F): 97.3 (30 Nov 2022 09:04), Max: 98.1 (29 Nov 2022 14:12)  HR: 69 (30 Nov 2022 09:04) (60 - 101)  BP: 122/67 (30 Nov 2022 09:04) (121/65 - 146/79)  BP(mean): 92 (29 Nov 2022 21:40) (92 - 109)  RR: 18 (30 Nov 2022 09:04) (18 - 19)  SpO2: 100% (30 Nov 2022 09:04) (97% - 100%)    Parameters below as of 30 Nov 2022 09:04  Patient On (Oxygen Delivery Method): room air    MEDICATIONS  (STANDING):  dexAMETHasone  Injectable 4 milliGRAM(s) IV Push every 6 hours  famotidine    Tablet 20 milliGRAM(s) Oral every 12 hours  influenza   Vaccine 0.5 milliLiter(s) IntraMuscular once  senna 2 Tablet(s) Oral at bedtime  spironolactone 75 milliGRAM(s) Oral at bedtime    MEDICATIONS  (PRN):  acetaminophen     Tablet .. 650 milliGRAM(s) Oral every 6 hours PRN Temp greater or equal to 38C (100.4F), Mild Pain (1 - 3)  acetaminophen   IVPB .. 1000 milliGRAM(s) IV Intermittent every 6 hours PRN Severe Pain (7 - 10)  albuterol    90 MICROgram(s) HFA Inhaler 2 Puff(s) Inhalation every 6 hours PRN Shortness of Breath and/or Wheezing  budesonide  80 MICROgram(s)/formoterol 4.5 MICROgram(s) Inhaler 2 Puff(s) Inhalation two times a day PRN sob/wheezing  cyclobenzaprine 10 milliGRAM(s) Oral three times a day PRN Muscle Spasm  HYDROmorphone  Injectable 0.5 milliGRAM(s) IV Push every 3 hours PRN severe breakthrough  ondansetron Injectable 4 milliGRAM(s) IV Push every 6 hours PRN Nausea and/or Vomiting  oxyCODONE    IR 5 milliGRAM(s) Oral every 4 hours PRN Moderate Pain (4 - 6)  oxyCODONE    IR 10 milliGRAM(s) Oral every 4 hours PRN Severe Pain (7 - 10)    ROS: pertinent positives in HPI, all other ROS were reviewed and are negative     PHYSICAL EXAM:  Constitutional: Awake / alert, NAD, resting comfortably in bed  Eyes: anicteric  sclerae moist conjunctivae PEARRLA  HENT: atraumatic, oropharynx clear with moist mucous membranes no mucosal ulcerations  Neck: trachea midline, FROM, supple, no thyromegaly or lymphadenopathy  Respiratory: Breath sounds are clear bilaterally, normal respiratory effort and no intercostal retractions   Cardiovascular: S1 and S2, regular rhythm  Gastrointestinal: Soft, NT/ND, +BS  Extremities:  no peripheral edema, no joint swelling/tenderness, +back pain with straight leg raise R>L  Skin: No rashes  Psych: appropriate affect, alert, and oriented to person, place and time    Neurological Exam:  HF: A x O x 3, appropriately interactive, normal affect, speech fluent, no aphasia or paraphasic errors. Naming /repetition intact   CN: PERRL, EOMI, VFF, facial sensation normal, no NLFD, tongue midline  Motor: No pronator drift, Strength 5/5 in all 4 ext, normal bulk and tone, no tremor, rigidity or bradykinesia  Sens: right foot numbness   Reflexes: Symmetric and normal, no red' s no clonus   Coord:  No FNFA, dysmetria, GUMARO intact   Gait/Balance: Not tested     LABS:                         13.5   10.79 )-----------( 266      ( 29 Nov 2022 15:34 )             41.4     11-29    136  |  108  |  9   ----------------------------<  99  3.9   |  22  |  0.52    Ca    9.7      29 Nov 2022 15:34    TPro  7.6  /  Alb  3.8  /  TBili  0.7  /  DBili  x   /  AST  20  /  ALT  34  /  AlkPhos  49  11-29    LIVER FUNCTIONS - ( 29 Nov 2022 15:34 )  Alb: 3.8 g/dL / Pro: 7.6 gm/dL / ALK PHOS: 49 U/L / ALT: 34 U/L / AST: 20 U/L / GGT: x           RADIOLOGY:  < from: MR Lumbar Spine No Cont (11.29.22 @ 18:38) >  FINDINGS: No acute fractures or dislocations are seen. The lumbar   lordotic curvature is preserved. There is no loss of vertebral body   height or aggressive marrow signal change. The conus medullaris appears   normal in signal and morphology and terminates appropriately.    The findings at the individual disc space levels are as follows:    T12-L1: Normal.    L1-L2: Normal.    L2-L3: Normal.    L3-L4: Disc space desiccation with relative preservation of height is   seen. An annular fissure is seen in the central zone canal. A shallow   bulging disc is seen which minimally deforms the ventral thecal sac.   There is no spinal canal or foraminal stenosis.    L4-L5: Disc space desiccation with very minor loss of height is seen. A   bulging disc is seen with a superimposed broad-based disc protrusion   which measures 1.0 x 2.2 cm in the axial plane(AP by transverse) with   the apex in the right lateral recess. The disc herniation displaces and   compresses the descending right-sided L5 nerve roots. There is overall   moderate canal stenosis. No foraminal narrowing is appreciated.    L5-S1: Normal.    IMPRESSION: Large right-sided lateral recess disc herniation at the L4-L5   level with impingement upon the descending right-sided L5 nerve roots.

## 2022-11-30 NOTE — PHYSICAL THERAPY INITIAL EVALUATION ADULT - PERTINENT HX OF CURRENT PROBLEM, REHAB EVAL
22 year old LH female with significant PMH that p/w LBP rdaiating down bilateral LEs R >L since 11/26, she reports she also has associated numbness in bilateral thighs & R foot. She denies any recent trauma, bowel/bladder dysfunction or saddle anesthesia.

## 2022-11-30 NOTE — PHYSICAL THERAPY INITIAL EVALUATION ADULT - CRITERIA FOR SKILLED THERAPEUTIC INTERVENTIONS
No Need for continued skilled acute care PT services at present, the pt should ambulate under nursing supervision PRN. RN made aware. - Will d/c from acute care PT program at present, please re-consult PT PRN./anticipated discharge recommendation

## 2022-11-30 NOTE — CONSULT NOTE ADULT - NS ATTEND AMEND GEN_ALL_CORE FT
pt seen and examined. dw np cierra villarreal. agree with documentation as above with changes made where appropriate.   22F pmh of asthma who is admitted with L4-5 disc herniation.   -pain control  -trial of steroids.   -has never been intubated or required hospitalization for asthma.   -good exercise tolerance prior to onset of back pain.   -no medical contraindications for OR at this time.   -physical therapy  -incentive spirometry  -bowel regimen.   -thanks will follow.           she denies any recent f/c/r  no burning on urinations  denies any issues with anesthesia but has only one surgery  with wisdom teeth removed  works out at gym regularly and plays softball.  denies any cardiac history

## 2022-11-30 NOTE — PROGRESS NOTE ADULT - ASSESSMENT
22 year old woman with no significant PMH resents with severe back pain and pain radiating down right leg  MRI shows large L4-5 disc herniation on the right side     PLAN-  Pt was offered surgery but would first like to try conservative management   Continue with decadron and pain meds as needed, oxycodone, flexeril and IV Tylenol PRN   physical therapy as tolerated   GI ppx with steroid  SCDs  for DVT ppx    Discussed with Dr. Arriola

## 2022-11-30 NOTE — PHYSICAL THERAPY INITIAL EVALUATION ADULT - MODALITIES TREATMENT COMMENTS
The pt demonstrated good overall activity tolerance, responding well to therex review, transfer and ambulation tx. The pt was returned to supine and adjusted for comfort in bed, bed alarm secured, RN aware. CB, tray and phone in place. The pt was in NAD at end of tx.

## 2022-12-01 ENCOUNTER — TRANSCRIPTION ENCOUNTER (OUTPATIENT)
Age: 22
End: 2022-12-01

## 2022-12-01 VITALS
HEART RATE: 68 BPM | DIASTOLIC BLOOD PRESSURE: 63 MMHG | OXYGEN SATURATION: 100 % | SYSTOLIC BLOOD PRESSURE: 116 MMHG | RESPIRATION RATE: 17 BRPM | TEMPERATURE: 97 F

## 2022-12-01 PROCEDURE — 99239 HOSP IP/OBS DSCHRG MGMT >30: CPT

## 2022-12-01 RX ORDER — FAMOTIDINE 10 MG/ML
1 INJECTION INTRAVENOUS
Qty: 28 | Refills: 0
Start: 2022-12-01 | End: 2022-12-14

## 2022-12-01 RX ORDER — DEXAMETHASONE 0.5 MG/5ML
2 ELIXIR ORAL
Qty: 36 | Refills: 0
Start: 2022-12-01 | End: 2022-12-09

## 2022-12-01 RX ORDER — SENNA PLUS 8.6 MG/1
2 TABLET ORAL
Qty: 60 | Refills: 0
Start: 2022-12-01 | End: 2022-12-30

## 2022-12-01 RX ORDER — OXYCODONE HYDROCHLORIDE 5 MG/1
1 TABLET ORAL
Qty: 30 | Refills: 0
Start: 2022-12-01 | End: 2022-12-07

## 2022-12-01 RX ORDER — CYCLOBENZAPRINE HYDROCHLORIDE 10 MG/1
1 TABLET, FILM COATED ORAL
Qty: 0 | Refills: 0 | DISCHARGE

## 2022-12-01 RX ORDER — CYCLOBENZAPRINE HYDROCHLORIDE 10 MG/1
1 TABLET, FILM COATED ORAL
Qty: 42 | Refills: 0
Start: 2022-12-01 | End: 2022-12-14

## 2022-12-01 RX ORDER — OXYCODONE HYDROCHLORIDE 5 MG/1
1 TABLET ORAL
Qty: 30 | Refills: 0
Start: 2022-12-01

## 2022-12-01 RX ORDER — MELOXICAM 15 MG/1
1 TABLET ORAL
Qty: 0 | Refills: 0 | DISCHARGE

## 2022-12-01 RX ADMIN — OXYCODONE HYDROCHLORIDE 10 MILLIGRAM(S): 5 TABLET ORAL at 08:50

## 2022-12-01 RX ADMIN — FAMOTIDINE 20 MILLIGRAM(S): 10 INJECTION INTRAVENOUS at 07:53

## 2022-12-01 RX ADMIN — INFLUENZA VIRUS VACCINE 0.5 MILLILITER(S): 15; 15; 15; 15 SUSPENSION INTRAMUSCULAR at 11:00

## 2022-12-01 RX ADMIN — OXYCODONE HYDROCHLORIDE 10 MILLIGRAM(S): 5 TABLET ORAL at 07:53

## 2022-12-01 RX ADMIN — Medication 4 MILLIGRAM(S): at 11:00

## 2022-12-01 RX ADMIN — Medication 4 MILLIGRAM(S): at 05:41

## 2022-12-01 NOTE — DISCHARGE NOTE PROVIDER - NSDCFUADDINST_GEN_ALL_CORE_FT
Advance diet and activity as tolerated   Avoid heavy lifting, bending or twisting   Drink plenty of water to avoid constipation  Do not drive if distracted by pain or pain medication  Follow up with Dr. Arriola in 2 weeks, sooner if pain increases or you have difficulty walking  Call the office with any questions or concerns

## 2022-12-01 NOTE — DISCHARGE NOTE PROVIDER - HOSPITAL COURSE
22 year old  female with significant PMH that p/w LBP radiating down bilateral LEs R >L since 11/26, she reports she also has associated numbness in bilateral thighs & R foot. She denies any recent trauma, bowel/bladder dysfunction or saddle anesthesia. She was admitted to the Neurosurgery service for conservative management. Started on Decadron and oxycodone.     11/30- Hospital Day #2, pt seen and examined on 2N, states pain is slightly better but has difficulty with changes in position. Tolerating decadron and states she is ambulating slightly better. Afebrile, tolerating PO, VSS.     12/1- Hospital day #3, pt doing better, pain better controlled, continued numbness o the top of her right foot, able to ambulate slowly but improved from admission. Pt would still like to continue with conservative management. Will discharge home today with a decadron taper and pain medications, plan to follow up with Dr. Arriola in 2 weeks or sooner if pain worsens. Prescriptions sent to pharmacy. Pt instructed to do nothing more strenuous than walking, avoid heavy lifting, avoid twisting, avoid bending.     Vital Signs Last 24 Hrs  T(C): 36.5 (01 Dec 2022 00:57), Max: 36.6 (30 Nov 2022 20:53)  T(F): 97.7 (01 Dec 2022 00:57), Max: 97.8 (30 Nov 2022 20:53)  HR: 64 (01 Dec 2022 00:57) (64 - 69)  BP: 111/67 (01 Dec 2022 00:57) (106/53 - 122/67)  RR: 17 (01 Dec 2022 00:57) (17 - 18)  SpO2: 99% (01 Dec 2022 00:57) (98% - 100%)

## 2022-12-01 NOTE — DISCHARGE NOTE NURSING/CASE MANAGEMENT/SOCIAL WORK - NSDCPEFALRISK_GEN_ALL_CORE
For information on Fall & Injury Prevention, visit: [email or web address] OR  [email or web address] OR  https://www.cdc.gov/steadi/patient.html

## 2022-12-01 NOTE — DISCHARGE NOTE PROVIDER - CARE PROVIDER_API CALL
Cristhian Arriola)  Neurosurgery  [address] [place] [address]  Phone: [phone]  Fax: [phone]  Follow Up Time: 2 weeks

## 2022-12-01 NOTE — DISCHARGE NOTE PROVIDER - NSDCMRMEDTOKEN_GEN_ALL_CORE_FT
cyclobenzaprine 10 mg oral tablet: 1 tab(s) orally 3 times a day, As Needed -for muscle spasm MDD:3   dexamethasone 2 mg oral tablet: Take two 2mg tabs by mouth twice daily for three days then take one 2mg tab by mouth twice daily for three days then take one 2mg tab by mouthonce a day for three days then STOP MDD:4  famotidine 20 mg oral tablet: 1 tab(s) orally every 12 hours MDD:2  ketoconazole 2% topical shampoo: Apply topically to affected area 3 times a week, As Needed  oxyCODONE 5 mg oral tablet: 1 tab(s) orally every 4 to 6 hours, As Needed -for severe pain MDD:6   senna leaf extract oral tablet: 2 tab(s) orally once a day (at bedtime), As Needed -for constipation   spironolactone 25 mg oral tablet: 3 tab(s) orally once a day (at bedtime)  Symbicort 80 mcg-4.5 mcg/inh inhalation aerosol: 2 puff(s) inhaled 2 times a day, As Needed  Tri-Lo-Sprintec oral tablet: 1 tab(s) orally once a day  ***pt has supply with her if needed, she restarted the 3 weeks of med this week***  Tylenol Extra Strength 500 mg oral tablet: 2 tab(s) orally every 6 hours, As Needed  Ventolin HFA 90 mcg/inh inhalation aerosol: 2 puff(s) inhaled every 6 hours, As Needed

## 2022-12-01 NOTE — DISCHARGE NOTE NURSING/CASE MANAGEMENT/SOCIAL WORK - NSDCVIVACCINE_GEN_ALL_CORE_FT
influenza, injectable, quadrivalent, preservative free; 01-Dec-2022 11:00; Sherrell Alcala (RN); [employer]; [id number] (Exp. Date: 30-Jun-2023); IntraMuscular; Deltoid Right.; 0.5 milliLiter(s); VIS (VIS Published: 06-Aug-2021, VIS Presented: 01-Dec-2022);

## 2022-12-01 NOTE — DISCHARGE NOTE NURSING/CASE MANAGEMENT/SOCIAL WORK - PATIENT PORTAL LINK FT
You can access the FollowMyHealth Patient Portal offered by [facility] by registering at the following website: [email or web address]. By joining [facility] [employer]’s FollowMyHealth portal, you will also be able to view your health information using other applications (apps) compatible with our system.

## 2022-12-01 NOTE — DISCHARGE NOTE PROVIDER - NSDCCPCAREPLAN_GEN_ALL_CORE_FT
PRINCIPAL DISCHARGE DIAGNOSIS  Diagnosis: Acute sciatica  Assessment and Plan of Treatment: Herniated lumbar disc at L4/5  Advance diet and activity as tolerated   If pain does not improve follow up with Dr. Arriola in 1-2 weeks   Take medication as prescribed   Continue all home meds as prescribed  Call the office with any questions or concerns      SECONDARY DISCHARGE DIAGNOSES  Diagnosis: Lumbar disc herniation  Assessment and Plan of Treatment:

## 2022-12-07 DIAGNOSIS — M51.16 INTERVERTEBRAL DISC DISORDERS WITH RADICULOPATHY, LUMBAR REGION: ICD-10-CM

## 2022-12-07 DIAGNOSIS — Z20.822 CONTACT WITH AND (SUSPECTED) EXPOSURE TO COVID-19: ICD-10-CM

## 2022-12-07 DIAGNOSIS — J45.909 UNSPECIFIED ASTHMA, UNCOMPLICATED: ICD-10-CM

## 2024-05-14 NOTE — PATIENT PROFILE ADULT - FUNCTIONAL ASSESSMENT - BASIC MOBILITY SECTION LABEL
Problem: Knowledge Deficit  Goal: Patient/family/caregiver demonstrates understanding of disease process, treatment plan, medications, and discharge instructions  Description: Complete learning assessment and assess knowledge base.  Interventions:  - Provide teaching at level of understanding  - Provide teaching via preferred learning methods  Outcome: Progressing      .